# Patient Record
Sex: MALE | Race: WHITE
[De-identification: names, ages, dates, MRNs, and addresses within clinical notes are randomized per-mention and may not be internally consistent; named-entity substitution may affect disease eponyms.]

---

## 2017-09-04 ENCOUNTER — HOSPITAL ENCOUNTER (EMERGENCY)
Dept: HOSPITAL 14 - H.ER | Age: 32
Discharge: HOME | End: 2017-09-04
Payer: MEDICAID

## 2017-09-04 VITALS
DIASTOLIC BLOOD PRESSURE: 71 MMHG | SYSTOLIC BLOOD PRESSURE: 120 MMHG | TEMPERATURE: 98.4 F | RESPIRATION RATE: 16 BRPM | HEART RATE: 67 BPM | OXYGEN SATURATION: 99 %

## 2017-09-04 DIAGNOSIS — M54.5: Primary | ICD-10-CM

## 2017-09-04 NOTE — ED PDOC
HPI: Back


Time Seen by Provider: 09/04/17 12:45


Chief Complaint (Nursing): Back Pain


Chief Complaint (Provider): low back pain


History Per: Patient


Additional Complaint(s): 


31-year-old male presents to emergency department with lower back pain. Patient 

has had low back pain on and off for 10 years. He states that this past Friday 

he lifted a heavy object and pain became worse. Patient took 1 200 mg Motrin 

tab on Friday but this did not help the pain. He has not taken anything since 

then. Patient denies bowel or bladder dysfunction. He states he has never been 

seen by his primary doctor for this ongoing issue.





Past Medical History


Reviewed: Historical Data, Nursing Documentation, Vital Signs


Vital Signs: 


 Last Vital Signs











Temp  98.4 F   09/04/17 12:39


 


Pulse  67   09/04/17 12:39


 


Resp  16   09/04/17 12:39


 


BP  120/71   09/04/17 12:39


 


Pulse Ox  99   09/04/17 12:39














- Medical History


PMH: Back Problems (low back pain on and off for 10 yers)





- Surgical History


Surgical History: No Surg Hx





- Family History


Family History: States: No Known Family Hx





- Living Arrangements


Living Arrangements: With Family





- Social History


Current smoker - smoking cessation education provided: Yes ("vape")


Alcohol: None


Drugs: Denies





- Home Medications


Home Medications: 


 Ambulatory Orders











 Medication  Instructions  Recorded


 


Atropine/DiphenoxylateAtropine 5 ml PO Q8 #50 ml 02/16/15





[Lomotil]  


 


Dicyclomine [Bentyl] 10 mg PO QID #20 cap 02/16/15


 


Amoxicillin/Clavulanate [Augmentin 1 tab PO BID #20 tab 02/29/16





500 MG-125 MG]  


 


Naproxen [Naprosyn] 500 mg PO BID PRN #30 tab 02/29/16


 


Cyclobenzaprine [Cyclobenzaprine 10 mg PO TID PRN #20 tab 09/04/17





HCl]  


 


Ibuprofen [Motrin Tab] 800 mg PO Q8 PRN #20 tab 09/04/17














- Allergies


Allergies/Adverse Reactions: 


 Allergies











Allergy/AdvReac Type Severity Reaction Status Date / Time


 


No Known Allergies Allergy   Verified 02/29/16 12:07














Review of Systems


ROS Statement: Except As Marked, All Systems Reviewed And Found Negative


Constitutional: Negative for: Fever


Cardiovascular: Negative for: Chest Pain


Respiratory: Negative for: Cough


Gastrointestinal: Negative for: Nausea, Vomiting


Genitourinary Male: Negative for: Dysuria, Frequency, Incontinence, Hematuria


Musculoskeletal: Positive for: Back Pain (on and off for 10 years).  Negative 

for: Leg Pain


Neurological: Negative for: Weakness, Numbness, Headache, Dizziness





Physical Exam





- Reviewed


Nursing Documentation Reviewed: Yes


Vital Signs Reviewed: Yes





- Physical Exam


Appears: Positive for: Well, Non-toxic, No Acute Distress


Skin: Negative for: Rash


Eye Exam: Positive for: Normal appearance, EOMI, PERRL


Neck: Positive for: Normal


Cardiovascular/Chest: Positive for: Regular Rate, Rhythm


Respiratory: Positive for: Normal Breath Sounds


Gastrointestinal/Abdominal: Positive for: Soft.  Negative for: Tenderness


Back: Positive for: Vertebral Tenderness (lumbar region, negative bilateral 

straight leg raise, patient able to heel and toe walk), Muscle Spasm (lumbar 

region).  Negative for: L CVA Tenderness, R CVA Tenderness


Extremity: Positive for: Normal ROM.  Negative for: Pedal Edema


Neurologic/Psych: Positive for: Alert, Oriented, Gait (steady)





- ECG


O2 Sat by Pulse Oximetry: 99


Pulse Ox Interpretation: Normal





Medical Decision Making


Medical Decision Making: 


Impression: low back pain





Plan:


Motrin PO dose





Patient was offered toradol but he declined this med. He took motrin dose 

instead.  Rx given for motrin and flexeril.  Patient was instructed to follow 

up with PMD or ortho for further evaluation of ongoing back pain.





Disposition





- Clinical Impression


Clinical Impression: 


 Back pain








- Patient ED Disposition


Is Patient to be Admitted: No


Counseled Patient/Family Regarding: Diagnosis, Need For Followup, Rx Given





- Disposition


Referrals: 


Alex Amezcua MD [Family Provider] - 


Rad Tay MD [Staff Provider] - 


Disposition: Routine/Home


Disposition Time: 13:03


Condition: STABLE


Additional Instructions: 


Take rx meds as directed as needed for pain.  Follow up ASAP with your primary 

care doctor or with referred orthopedist.  


Prescriptions: 


Cyclobenzaprine [Cyclobenzaprine HCl] 10 mg PO TID PRN #20 tab


 PRN Reason: Muscle Spasm


Ibuprofen [Motrin Tab] 800 mg PO Q8 PRN #20 tab


 PRN Reason: Pain, Moderate (4-7)


Instructions:  Back Pain (ED)


Forms:  [facility] (English)

## 2018-09-27 ENCOUNTER — HOSPITAL ENCOUNTER (EMERGENCY)
Dept: HOSPITAL 14 - H.ER | Age: 33
Discharge: HOME | End: 2018-09-27
Payer: MEDICAID

## 2018-09-27 VITALS
DIASTOLIC BLOOD PRESSURE: 96 MMHG | SYSTOLIC BLOOD PRESSURE: 152 MMHG | HEART RATE: 62 BPM | TEMPERATURE: 98.4 F | RESPIRATION RATE: 16 BRPM | OXYGEN SATURATION: 100 %

## 2018-09-27 DIAGNOSIS — K08.89: Primary | ICD-10-CM

## 2018-09-27 PROCEDURE — 99282 EMERGENCY DEPT VISIT SF MDM: CPT

## 2018-09-27 PROCEDURE — 96372 THER/PROPH/DIAG INJ SC/IM: CPT

## 2018-09-27 NOTE — ED PDOC
HPI: Dental Pain/Injury


Time Seen by Provider: 09/27/18 16:13


Chief Complaint (Nursing): Dental Pain


Chief Complaint (Provider): Dental Pain


History Per: Patient


History/Exam Limitations: no limitations


Onset/Duration Of Symptoms: Hrs


Current Symptoms Are (Timing): Still Present


Additional Complaint(s): 


33 y/o male presents to the ED complaining of left dental pain. Patient notes 

that in June he saw a dentist and was told he required a procedure on the left 

upper tooth. Developed pain to the left upper and lower teeth. Pain radiates up 

the head, into left ear, and down the left side of throat. Earlier today he 

tried taking Aleve without relief. Denies fever, trauma, pain with swallowing, 

head injury, hearing changes, or visual changes. 








Past Medical History


Reviewed: Historical Data, Nursing Documentation, Vital Signs


Vital Signs: 


                                Last Vital Signs











Temp  98.4 F   09/27/18 16:10


 


Pulse  62   09/27/18 16:10


 


Resp  16   09/27/18 16:10


 


BP  152/96 H  09/27/18 16:10


 


Pulse Ox  100   09/27/18 16:10














- Medical History


PMH: Back Problems (low back pain on and off for 10 yers)





- Family History


Family History: States: No Known Family Hx





- Home Medications


Home Medications: 


                                Ambulatory Orders











 Medication  Instructions  Recorded


 


Atropine/DiphenoxylateAtropine 5 ml PO Q8 #50 ml 02/16/15





[Lomotil]  


 


Dicyclomine [Bentyl] 10 mg PO QID #20 cap 02/16/15


 


Amoxicillin/Clavulanate [Augmentin 1 tab PO BID #20 tab 02/29/16





500 MG-125 MG]  


 


Naproxen [Naprosyn] 500 mg PO BID PRN #30 tab 02/29/16


 


Cyclobenzaprine [Cyclobenzaprine 10 mg PO TID PRN #20 tab 09/04/17





HCl]  


 


Ibuprofen [Motrin Tab] 800 mg PO Q8 PRN #20 tab 09/04/17


 


Ibuprofen [Motrin Tab] 800 mg PO Q8 PRN #15 tab 09/27/18


 


Tramadol HCl [Ultram] 50 mg PO BID PRN #8 tablet 09/27/18














- Allergies


Allergies/Adverse Reactions: 


                                    Allergies











Allergy/AdvReac Type Severity Reaction Status Date / Time


 


No Known Allergies Allergy   Verified 02/29/16 12:07














Review of Systems


ROS Statement: Except As Marked, All Systems Reviewed And Found Negative


Constitutional: Negative for: Fever, Chills, Other (Hearing loss)


Eyes: Negative for: Vision Change


ENT: Positive for: Ear Pain, Throat Pain, Other (Left toothache, no difficulty 

swallowing).  Negative for: Throat Swelling


Cardiovascular: Negative for: Chest Pain, Light Headedness


Respiratory: Negative for: Shortness of Breath





Physical Exam





- Reviewed


Nursing Documentation Reviewed: Yes


Vital Signs Reviewed: Yes





- Physical Exam


Appears: Positive for: Non-toxic, In Acute Distress (minimal painful distress)


Head Exam: Positive for: ATRAUMATIC, NORMAL INSPECTION, NORMOCEPHALIC


Skin: Positive for: Normal Color, Warm, Dry


Eye Exam: Positive for: Normal appearance


ENT: Positive for: TM Is/Are (non-bulging and non-erythematous bilaterally), 

Other (Able to swallow without pain; Dental caries noted in the left maxillary 

and mandibular molar and pre-molar teeth).  Negative for: Pharyngeal Erythema, 

Tonsillar Swelling (or erythema)


Neck: Positive for: Normal, Painless ROM, Supple


Cardiovascular/Chest: Positive for: Regular Rate, Rhythm


Respiratory: Positive for: Normal Breath Sounds.  Negative for: Accessory Muscle

 Use, Respiratory Distress


Neurologic/Psych: Positive for: Alert, Oriented (x3).  Negative for: 

Motor/Sensory Deficits





- ECG


O2 Sat by Pulse Oximetry: 100 (RA)


Pulse Ox Interpretation: Normal





Medical Decision Making


Medical Decision Making: 





Impression: Dental pain





Initial Plan:


--Toradol 30 mg IM





Patient is stable for discharge home. Counseled regarding diagnosis and the 

importance of follow up with dentist.





 

--------------------------------------------------------------------------------


-----------------


Scribe Attestation:


Documented by Estefany Matta, acting as a scribe for Flaco Kay PA-C.





Provider Scribe Attestation:


All medical record entries made by the Scribe were at my direction and 

personally dictated by me. I have reviewed the chart and agree that the record 

accurately reflects my personal performance of the history, physical exam, 

medical decision making, and the department course for this patient. I have also

 personally directed, reviewed, and agree with the discharge instructions and 

disposition.








Disposition





- Clinical Impression


Clinical Impression: 


 Toothache








- Patient ED Disposition


Is Patient to be Admitted: No


Counseled Patient/Family Regarding: Diagnosis, Need For Followup





- Disposition


Referrals: 


[facility] [Outside]


Disposition: Routine/Home


Disposition Time: 16:35


Condition: STABLE


Additional Instructions: 





PAULETTE YING, thank you for letting us take care of you today. Your provider was 

Kurt Hodges III, DO and you were treated for LEFT EAR PAIN, TOOTHACHE. The 

emergency medical care you received today was directed at your acute symptoms. 

If you were prescribed any medication, please fill it and take as directed. It 

may take several days for your symptoms to resolve. Return to the Emergency 

Department if your symptoms worsen, do not improve, or if you have any other 

problems.





Please contact your doctor or call one of the physicians/clinics you have been 

referred to that are listed on the Patient Visit Information form that is 

included in your discharge packet. Bring any paperwork you were given at 

discharge with you along with any medications you are taking to your follow up 

visit. Our treatment cannot replace ongoing medical care by a primary care 

provider outside of the emergency department.





Thank you for allowing the [facility] team to be part of your care today.








If you had an X-Ray or CT scan: A Radiologist will review the ED reading if any 

change in treatment is needed we will contact you.***





If you had a blood, urine, or wound culture: It will take several days for the 

results, if any change in treatment is needed we will contact you.***





If you had an STI test: It will take 48 hours for the results. Please call after

 1 week if you have not heard back.***


Prescriptions: 


Ibuprofen [Motrin Tab] 800 mg PO Q8 PRN #15 tab


 PRN Reason: Pain


Tramadol HCl [Ultram] 50 mg PO BID PRN #8 tablet


 PRN Reason: Pain


Instructions:  Dental Pain (DC)





- PA / NP / Resident Statement


MD/ has reviewed & agrees with the documentation as recorded.